# Patient Record
Sex: FEMALE | Race: WHITE | NOT HISPANIC OR LATINO | URBAN - METROPOLITAN AREA
[De-identification: names, ages, dates, MRNs, and addresses within clinical notes are randomized per-mention and may not be internally consistent; named-entity substitution may affect disease eponyms.]

---

## 2017-09-30 ENCOUNTER — OFFICE VISIT (OUTPATIENT)
Dept: RETAIL CLINIC | Facility: CLINIC | Age: 32
End: 2017-09-30

## 2017-09-30 DIAGNOSIS — Z02.83 ENCOUNTER FOR DRUG SCREENING: Primary | ICD-10-CM

## 2017-09-30 NOTE — PROGRESS NOTES
Reason for Appointment  1. escreen    History of Present Illness  HPI:   See scanned document. See custody control form.    Assessments  1. Encounter for screening, unspecified - Z13.9    This document has been electronically signed by KELLY Zapata September 30, 2017 5:12 PM

## 2023-09-11 ENCOUNTER — OFFICE VISIT (OUTPATIENT)
Dept: FAMILY MEDICINE CLINIC | Facility: CLINIC | Age: 38
End: 2023-09-11
Payer: COMMERCIAL

## 2023-09-11 ENCOUNTER — LAB (OUTPATIENT)
Dept: LAB | Facility: HOSPITAL | Age: 38
End: 2023-09-11
Payer: MEDICAID

## 2023-09-11 VITALS
SYSTOLIC BLOOD PRESSURE: 114 MMHG | WEIGHT: 204.4 LBS | OXYGEN SATURATION: 99 % | HEART RATE: 76 BPM | RESPIRATION RATE: 16 BRPM | DIASTOLIC BLOOD PRESSURE: 84 MMHG | BODY MASS INDEX: 36.21 KG/M2 | HEIGHT: 63 IN | TEMPERATURE: 98.7 F

## 2023-09-11 DIAGNOSIS — N63.23 MASS OF LOWER OUTER QUADRANT OF LEFT BREAST: ICD-10-CM

## 2023-09-11 DIAGNOSIS — N64.4 BREAST PAIN, LEFT: ICD-10-CM

## 2023-09-11 DIAGNOSIS — Z13.220 SCREENING CHOLESTEROL LEVEL: ICD-10-CM

## 2023-09-11 DIAGNOSIS — Z13.1 SCREENING FOR DIABETES MELLITUS: ICD-10-CM

## 2023-09-11 DIAGNOSIS — R10.11 RUQ PAIN: Primary | ICD-10-CM

## 2023-09-11 DIAGNOSIS — R10.11 RUQ PAIN: ICD-10-CM

## 2023-09-11 LAB
ALBUMIN SERPL-MCNC: 4.1 G/DL (ref 3.5–5.2)
ALBUMIN/GLOB SERPL: 1.5 G/DL
ALP SERPL-CCNC: 111 U/L (ref 39–117)
ALT SERPL W P-5'-P-CCNC: 20 U/L (ref 1–33)
ANION GAP SERPL CALCULATED.3IONS-SCNC: 8 MMOL/L (ref 5–15)
AST SERPL-CCNC: 19 U/L (ref 1–32)
BILIRUB SERPL-MCNC: <0.2 MG/DL (ref 0–1.2)
BUN SERPL-MCNC: 10 MG/DL (ref 6–20)
BUN/CREAT SERPL: 12.7 (ref 7–25)
CALCIUM SPEC-SCNC: 9.3 MG/DL (ref 8.6–10.5)
CHLORIDE SERPL-SCNC: 108 MMOL/L (ref 98–107)
CHOLEST SERPL-MCNC: 161 MG/DL (ref 0–200)
CO2 SERPL-SCNC: 24 MMOL/L (ref 22–29)
CREAT SERPL-MCNC: 0.79 MG/DL (ref 0.57–1)
EGFRCR SERPLBLD CKD-EPI 2021: 99.6 ML/MIN/1.73
GLOBULIN UR ELPH-MCNC: 2.8 GM/DL
GLUCOSE SERPL-MCNC: 88 MG/DL (ref 65–99)
HBA1C MFR BLD: 5.3 % (ref 4.8–5.6)
HDLC SERPL-MCNC: 47 MG/DL (ref 40–60)
LDLC SERPL CALC-MCNC: 91 MG/DL (ref 0–100)
LDLC/HDLC SERPL: 1.89 {RATIO}
POTASSIUM SERPL-SCNC: 4.4 MMOL/L (ref 3.5–5.2)
PROT SERPL-MCNC: 6.9 G/DL (ref 6–8.5)
SODIUM SERPL-SCNC: 140 MMOL/L (ref 136–145)
TRIGL SERPL-MCNC: 127 MG/DL (ref 0–150)
VLDLC SERPL-MCNC: 23 MG/DL (ref 5–40)

## 2023-09-11 PROCEDURE — 80053 COMPREHEN METABOLIC PANEL: CPT

## 2023-09-11 PROCEDURE — 80061 LIPID PANEL: CPT

## 2023-09-11 PROCEDURE — 99214 OFFICE O/P EST MOD 30 MIN: CPT | Performed by: STUDENT IN AN ORGANIZED HEALTH CARE EDUCATION/TRAINING PROGRAM

## 2023-09-11 PROCEDURE — 83036 HEMOGLOBIN GLYCOSYLATED A1C: CPT

## 2023-09-11 RX ORDER — CYCLOBENZAPRINE HCL 5 MG
TABLET ORAL
COMMUNITY
Start: 2023-04-14 | End: 2023-09-11

## 2023-09-11 RX ORDER — CEFDINIR 300 MG/1
CAPSULE ORAL
COMMUNITY
Start: 2023-04-21 | End: 2023-09-11

## 2023-09-11 RX ORDER — DOXYCYCLINE HYCLATE 100 MG
TABLET ORAL
COMMUNITY
Start: 2023-06-16 | End: 2023-09-11

## 2023-09-11 NOTE — PROGRESS NOTES
New Patient Office Visit      Subjective      Chief Complaint:  Establish Care (New patient, needing mammogram referral and having upper right abdominal pain in gallbladder area patient said. Left breast burns and itches. Last Pap 2020-[facility])      History of Present Illness: Susan Anderson is a 37 y.o. female who presents for a new patient visit.  Breast pain and abdominal pain.     1 month of left breast itching and pain worse with palpation.   Aunt on mothers side has breast cancer. Age 55     RUQ pain worse in the morning. Worse a night as well. Can be worse after eating. Worse after caffeine. Some pain to the right shoulder blade.     Past Medical History:   Diagnosis Date     3 para 3        Past Surgical History:   Procedure Laterality Date    HYSTERECTOMY         Family History   Problem Relation Age of Onset    Diabetes type II Mother     Cervical cancer Maternal Aunt     Breast cancer Maternal Aunt     Bone cancer Maternal Aunt     Pancreatic cancer Paternal Uncle     Skin cancer Cousin     Stomach cancer Cousin     Liver cancer Cousin     Colon cancer Cousin     Pancreatic cancer Cousin        Social History     Socioeconomic History    Marital status:    Tobacco Use    Smoking status: Former     Packs/day: 0.25     Years: 3.00     Pack years: 0.75     Types: Cigarettes     Quit date: 2019     Years since quittin.0    Smokeless tobacco: Never   Vaping Use    Vaping Use: Never used   Substance and Sexual Activity    Alcohol use: Not Currently    Drug use: Never    Sexual activity: Yes     Partners: Male     Birth control/protection: Hysterectomy         Current Outpatient Medications:     azithromycin (ZITHROMAX) 250 MG tablet, Take 1 tablet by mouth Daily. Take 2 tablets the first day, then 1 tablet daily for 4 days. (Patient not taking: Reported on 2023), Disp: 6 tablet, Rfl: 0    Allergies   Allergen Reactions    Sulfamethoxazole-Trimethoprim Other (See  "Comments)     Yeast    thrush/yeast infection       Objective     Physical Exam:  Vital Signs:  /84 (BP Location: Right arm, Patient Position: Sitting, Cuff Size: Adult)   Pulse 76   Temp 98.7 °F (37.1 °C) (Temporal)   Resp 16   Ht 160 cm (63\")   Wt 92.7 kg (204 lb 6.4 oz)   SpO2 99%   BMI 36.21 kg/m²      Physical Exam  Constitutional:       General: She is not in acute distress.     Appearance: She is not ill-appearing.   Regular rate and rhythm no murmurs CTAB  +murphys sign   Right breast exam WNL  Left breast with sub centimeter mass to left of areola. No changes to areola or nipple.     Amanda pallazola LPN is chaperone        Assessment / Plan      Assessment/Plan:   Diagnoses and all orders for this visit:    1. RUQ pain (Primary)  -     US Gallbladder; Future  -     Comprehensive Metabolic Panel; Future  -     Likely cholelithiasis get ultrasound and likely refer to general surgery pending result.  Discussed ED precautions including worsening pain or feeling unwell would be reason go to ER and discussed risk of cholecystitis    2. Screening cholesterol level  -     Lipid Panel; Future    3. Screening for diabetes mellitus  -     Hemoglobin A1c; Future    4. Mass of lower outer quadrant of left breast  -     Mammo Diagnostic Digital Tomosynthesis Bilateral With CAD; Future    5. Breast pain, left  -     Mammo Diagnostic Digital Tomosynthesis Bilateral With CAD; Future  -     Small area of abnormality to the left breast is either fibrocystic changes versus small mass with associated tenderness.  -Mammogram with ultrasound  -Supportive bra, NSAIDs, limit caffeine intake          Follow Up:   No follow-ups on file.    MDM: 2 new problems uncertain prognosis req danita schroederal. 3+ Labs diagnostic test     Mark Funez MD  Family Medicine - [place] [facility] [place] [facility] [place]    "

## 2023-09-28 ENCOUNTER — HOSPITAL ENCOUNTER (OUTPATIENT)
Dept: ULTRASOUND IMAGING | Facility: HOSPITAL | Age: 38
Discharge: HOME | End: 2023-09-28
Admitting: STUDENT IN AN ORGANIZED HEALTH CARE EDUCATION/TRAINING PROGRAM
Payer: MEDICAID

## 2023-09-28 DIAGNOSIS — R10.11 RUQ PAIN: ICD-10-CM

## 2023-09-28 PROCEDURE — 76705 ECHO EXAM OF ABDOMEN: CPT

## 2024-10-27 PROBLEM — J02.9 SORE THROAT: Status: ACTIVE | Noted: 2024-10-27

## 2025-02-11 ENCOUNTER — PATIENT ROUNDING (BHMG ONLY) (OUTPATIENT)
Dept: URGENT CARE | Facility: CLINIC | Age: 40
End: 2025-02-11
Payer: MEDICAID

## 2025-02-11 NOTE — ED NOTES
Thank you for letting us care for you in your recent visit to our urgent care center. We would love to hear about your experience with us. Was this the first time you have visited our location?    We’re always looking for ways to make our patients’ experiences even better. Do you have any recommendations on ways we may improve?     I appreciate you taking the time to respond. Please be on the lookout for a survey about your recent visit from [employer] via text or email. We would greatly appreciate if you could fill that out and turn it back in. We want your voice to be heard and we value your feedback.   Thank you for choosing [facility] for your healthcare needs.